# Patient Record
Sex: FEMALE | Race: WHITE | NOT HISPANIC OR LATINO | URBAN - METROPOLITAN AREA
[De-identification: names, ages, dates, MRNs, and addresses within clinical notes are randomized per-mention and may not be internally consistent; named-entity substitution may affect disease eponyms.]

---

## 2019-03-14 ENCOUNTER — OUTPATIENT (OUTPATIENT)
Dept: OUTPATIENT SERVICES | Facility: HOSPITAL | Age: 34
LOS: 1 days | Discharge: HOME | End: 2019-03-14

## 2019-03-18 LAB — SURGICAL PATHOLOGY STUDY: SIGNIFICANT CHANGE UP

## 2019-12-30 ENCOUNTER — APPOINTMENT (OUTPATIENT)
Dept: ANTEPARTUM | Facility: CLINIC | Age: 34
End: 2019-12-30
Payer: COMMERCIAL

## 2019-12-30 PROCEDURE — 76813 OB US NUCHAL MEAS 1 GEST: CPT

## 2020-01-02 PROBLEM — Z00.00 ENCOUNTER FOR PREVENTIVE HEALTH EXAMINATION: Status: ACTIVE | Noted: 2020-01-02

## 2020-02-24 ENCOUNTER — APPOINTMENT (OUTPATIENT)
Dept: ANTEPARTUM | Facility: CLINIC | Age: 35
End: 2020-02-24
Payer: COMMERCIAL

## 2020-02-24 PROCEDURE — 76811 OB US DETAILED SNGL FETUS: CPT

## 2020-02-24 PROCEDURE — 76817 TRANSVAGINAL US OBSTETRIC: CPT

## 2020-03-17 ENCOUNTER — APPOINTMENT (OUTPATIENT)
Dept: ANTEPARTUM | Facility: CLINIC | Age: 35
End: 2020-03-17
Payer: COMMERCIAL

## 2020-03-17 PROCEDURE — 76816 OB US FOLLOW-UP PER FETUS: CPT

## 2020-05-26 ENCOUNTER — APPOINTMENT (OUTPATIENT)
Dept: ANTEPARTUM | Facility: CLINIC | Age: 35
End: 2020-05-26
Payer: COMMERCIAL

## 2020-05-26 PROCEDURE — 76816 OB US FOLLOW-UP PER FETUS: CPT

## 2020-06-16 ENCOUNTER — APPOINTMENT (OUTPATIENT)
Dept: ANTEPARTUM | Facility: CLINIC | Age: 35
End: 2020-06-16
Payer: COMMERCIAL

## 2020-06-16 PROCEDURE — 76819 FETAL BIOPHYS PROFIL W/O NST: CPT

## 2020-06-16 PROCEDURE — 76816 OB US FOLLOW-UP PER FETUS: CPT

## 2020-07-07 ENCOUNTER — APPOINTMENT (OUTPATIENT)
Dept: ANTEPARTUM | Facility: CLINIC | Age: 35
End: 2020-07-07
Payer: COMMERCIAL

## 2020-07-07 PROCEDURE — 76819 FETAL BIOPHYS PROFIL W/O NST: CPT

## 2020-07-10 ENCOUNTER — TRANSCRIPTION ENCOUNTER (OUTPATIENT)
Age: 35
End: 2020-07-10

## 2020-07-11 ENCOUNTER — INPATIENT (INPATIENT)
Facility: HOSPITAL | Age: 35
LOS: 1 days | Discharge: HOME | End: 2020-07-13
Attending: OBSTETRICS & GYNECOLOGY | Admitting: OBSTETRICS & GYNECOLOGY
Payer: COMMERCIAL

## 2020-07-11 ENCOUNTER — RESULT REVIEW (OUTPATIENT)
Age: 35
End: 2020-07-11

## 2020-07-11 VITALS — HEIGHT: 64 IN | WEIGHT: 164.91 LBS

## 2020-07-11 DIAGNOSIS — Z3A.00 WEEKS OF GESTATION OF PREGNANCY NOT SPECIFIED: ICD-10-CM

## 2020-07-11 DIAGNOSIS — O26.899 OTHER SPECIFIED PREGNANCY RELATED CONDITIONS, UNSPECIFIED TRIMESTER: ICD-10-CM

## 2020-07-11 LAB
BASOPHILS # BLD AUTO: 0.03 K/UL — SIGNIFICANT CHANGE UP (ref 0–0.2)
BASOPHILS NFR BLD AUTO: 0.2 % — SIGNIFICANT CHANGE UP (ref 0–2)
BLD GP AB SCN SERPL QL: NEGATIVE — SIGNIFICANT CHANGE UP
EOSINOPHIL # BLD AUTO: 0 K/UL — SIGNIFICANT CHANGE UP (ref 0–0.5)
EOSINOPHIL NFR BLD AUTO: 0 % — SIGNIFICANT CHANGE UP (ref 0–6)
HCT VFR BLD CALC: 37.5 % — SIGNIFICANT CHANGE UP (ref 34.5–45)
HGB BLD-MCNC: 13 G/DL — SIGNIFICANT CHANGE UP (ref 11.5–15.5)
IMM GRANULOCYTES NFR BLD AUTO: 0.7 % — SIGNIFICANT CHANGE UP (ref 0–1.5)
LYMPHOCYTES # BLD AUTO: 1.12 K/UL — SIGNIFICANT CHANGE UP (ref 1–3.3)
LYMPHOCYTES # BLD AUTO: 8.3 % — LOW (ref 13–44)
MCHC RBC-ENTMCNC: 34.3 PG — HIGH (ref 27–34)
MCHC RBC-ENTMCNC: 34.7 GM/DL — SIGNIFICANT CHANGE UP (ref 32–36)
MCV RBC AUTO: 98.9 FL — SIGNIFICANT CHANGE UP (ref 80–100)
MONOCYTES # BLD AUTO: 0.79 K/UL — SIGNIFICANT CHANGE UP (ref 0–0.9)
MONOCYTES NFR BLD AUTO: 5.9 % — SIGNIFICANT CHANGE UP (ref 2–14)
NEUTROPHILS # BLD AUTO: 11.42 K/UL — HIGH (ref 1.8–7.4)
NEUTROPHILS NFR BLD AUTO: 84.9 % — HIGH (ref 43–77)
NRBC # BLD: 0 /100 WBCS — SIGNIFICANT CHANGE UP (ref 0–0)
PLATELET # BLD AUTO: 171 K/UL — SIGNIFICANT CHANGE UP (ref 150–400)
RBC # BLD: 3.79 M/UL — LOW (ref 3.8–5.2)
RBC # FLD: 12.9 % — SIGNIFICANT CHANGE UP (ref 10.3–14.5)
RH IG SCN BLD-IMP: POSITIVE — SIGNIFICANT CHANGE UP
RH IG SCN BLD-IMP: POSITIVE — SIGNIFICANT CHANGE UP
SARS-COV-2 RNA SPEC QL NAA+PROBE: SIGNIFICANT CHANGE UP
T PALLIDUM AB TITR SER: NEGATIVE — SIGNIFICANT CHANGE UP
WBC # BLD: 13.45 K/UL — HIGH (ref 3.8–10.5)
WBC # FLD AUTO: 13.45 K/UL — HIGH (ref 3.8–10.5)

## 2020-07-11 PROCEDURE — 88307 TISSUE EXAM BY PATHOLOGIST: CPT | Mod: 26

## 2020-07-11 RX ORDER — DEXAMETHASONE 0.5 MG/5ML
4 ELIXIR ORAL EVERY 6 HOURS
Refills: 0 | Status: DISCONTINUED | OUTPATIENT
Start: 2020-07-11 | End: 2020-07-11

## 2020-07-11 RX ORDER — AMPICILLIN TRIHYDRATE 250 MG
2 CAPSULE ORAL ONCE
Refills: 0 | Status: COMPLETED | OUTPATIENT
Start: 2020-07-11 | End: 2020-07-11

## 2020-07-11 RX ORDER — CITRIC ACID/SODIUM CITRATE 300-500 MG
15 SOLUTION, ORAL ORAL EVERY 6 HOURS
Refills: 0 | Status: DISCONTINUED | OUTPATIENT
Start: 2020-07-11 | End: 2020-07-11

## 2020-07-11 RX ORDER — FENTANYL/BUPIVACAINE/NS/PF 2MCG/ML-.1
250 PLASTIC BAG, INJECTION (ML) INJECTION
Refills: 0 | Status: DISCONTINUED | OUTPATIENT
Start: 2020-07-11 | End: 2020-07-11

## 2020-07-11 RX ORDER — IBUPROFEN 200 MG
600 TABLET ORAL EVERY 6 HOURS
Refills: 0 | Status: COMPLETED | OUTPATIENT
Start: 2020-07-11 | End: 2021-06-09

## 2020-07-11 RX ORDER — OXYTOCIN 10 UNIT/ML
333.33 VIAL (ML) INJECTION
Qty: 20 | Refills: 0 | Status: DISCONTINUED | OUTPATIENT
Start: 2020-07-11 | End: 2020-07-11

## 2020-07-11 RX ORDER — OXYCODONE HYDROCHLORIDE 5 MG/1
5 TABLET ORAL
Refills: 0 | Status: DISCONTINUED | OUTPATIENT
Start: 2020-07-11 | End: 2020-07-13

## 2020-07-11 RX ORDER — DIPHENHYDRAMINE HCL 50 MG
25 CAPSULE ORAL EVERY 6 HOURS
Refills: 0 | Status: DISCONTINUED | OUTPATIENT
Start: 2020-07-11 | End: 2020-07-13

## 2020-07-11 RX ORDER — KETOROLAC TROMETHAMINE 30 MG/ML
30 SYRINGE (ML) INJECTION EVERY 6 HOURS
Refills: 0 | Status: DISCONTINUED | OUTPATIENT
Start: 2020-07-11 | End: 2020-07-12

## 2020-07-11 RX ORDER — LANOLIN
1 OINTMENT (GRAM) TOPICAL EVERY 6 HOURS
Refills: 0 | Status: DISCONTINUED | OUTPATIENT
Start: 2020-07-11 | End: 2020-07-13

## 2020-07-11 RX ORDER — OXYCODONE HYDROCHLORIDE 5 MG/1
5 TABLET ORAL ONCE
Refills: 0 | Status: DISCONTINUED | OUTPATIENT
Start: 2020-07-11 | End: 2020-07-13

## 2020-07-11 RX ORDER — SODIUM CHLORIDE 9 MG/ML
1000 INJECTION, SOLUTION INTRAVENOUS
Refills: 0 | Status: DISCONTINUED | OUTPATIENT
Start: 2020-07-11 | End: 2020-07-13

## 2020-07-11 RX ORDER — OXYTOCIN 10 UNIT/ML
2 VIAL (ML) INJECTION
Qty: 30 | Refills: 0 | Status: DISCONTINUED | OUTPATIENT
Start: 2020-07-11 | End: 2020-07-11

## 2020-07-11 RX ORDER — ENOXAPARIN SODIUM 100 MG/ML
40 INJECTION SUBCUTANEOUS EVERY 24 HOURS
Refills: 0 | Status: DISCONTINUED | OUTPATIENT
Start: 2020-07-12 | End: 2020-07-13

## 2020-07-11 RX ORDER — SODIUM CHLORIDE 9 MG/ML
1000 INJECTION, SOLUTION INTRAVENOUS
Refills: 0 | Status: DISCONTINUED | OUTPATIENT
Start: 2020-07-11 | End: 2020-07-11

## 2020-07-11 RX ORDER — TETANUS TOXOID, REDUCED DIPHTHERIA TOXOID AND ACELLULAR PERTUSSIS VACCINE, ADSORBED 5; 2.5; 8; 8; 2.5 [IU]/.5ML; [IU]/.5ML; UG/.5ML; UG/.5ML; UG/.5ML
0.5 SUSPENSION INTRAMUSCULAR ONCE
Refills: 0 | Status: DISCONTINUED | OUTPATIENT
Start: 2020-07-11 | End: 2020-07-13

## 2020-07-11 RX ORDER — ONDANSETRON 8 MG/1
4 TABLET, FILM COATED ORAL EVERY 6 HOURS
Refills: 0 | Status: DISCONTINUED | OUTPATIENT
Start: 2020-07-11 | End: 2020-07-11

## 2020-07-11 RX ORDER — MORPHINE SULFATE 50 MG/1
4 CAPSULE, EXTENDED RELEASE ORAL ONCE
Refills: 0 | Status: DISCONTINUED | OUTPATIENT
Start: 2020-07-11 | End: 2020-07-11

## 2020-07-11 RX ORDER — AMPICILLIN TRIHYDRATE 250 MG
1 CAPSULE ORAL EVERY 4 HOURS
Refills: 0 | Status: DISCONTINUED | OUTPATIENT
Start: 2020-07-11 | End: 2020-07-11

## 2020-07-11 RX ORDER — SIMETHICONE 80 MG/1
80 TABLET, CHEWABLE ORAL EVERY 4 HOURS
Refills: 0 | Status: DISCONTINUED | OUTPATIENT
Start: 2020-07-11 | End: 2020-07-13

## 2020-07-11 RX ORDER — NALOXONE HYDROCHLORIDE 4 MG/.1ML
0.1 SPRAY NASAL
Refills: 0 | Status: DISCONTINUED | OUTPATIENT
Start: 2020-07-11 | End: 2020-07-11

## 2020-07-11 RX ORDER — ACETAMINOPHEN 500 MG
975 TABLET ORAL
Refills: 0 | Status: DISCONTINUED | OUTPATIENT
Start: 2020-07-11 | End: 2020-07-13

## 2020-07-11 RX ORDER — MAGNESIUM HYDROXIDE 400 MG/1
30 TABLET, CHEWABLE ORAL
Refills: 0 | Status: DISCONTINUED | OUTPATIENT
Start: 2020-07-11 | End: 2020-07-13

## 2020-07-11 RX ORDER — OXYTOCIN 10 UNIT/ML
333.33 VIAL (ML) INJECTION
Qty: 20 | Refills: 0 | Status: DISCONTINUED | OUTPATIENT
Start: 2020-07-11 | End: 2020-07-13

## 2020-07-11 RX ADMIN — Medication 108 GRAM(S): at 07:03

## 2020-07-11 RX ADMIN — SODIUM CHLORIDE 125 MILLILITER(S): 9 INJECTION, SOLUTION INTRAVENOUS at 20:38

## 2020-07-11 RX ADMIN — SODIUM CHLORIDE 125 MILLILITER(S): 9 INJECTION, SOLUTION INTRAVENOUS at 03:00

## 2020-07-11 RX ADMIN — Medication 2 MILLIUNIT(S)/MIN: at 08:22

## 2020-07-11 RX ADMIN — Medication 216 GRAM(S): at 02:59

## 2020-07-11 RX ADMIN — Medication 1000 MILLIUNIT(S)/MIN: at 15:20

## 2020-07-11 RX ADMIN — Medication 975 MILLIGRAM(S): at 20:36

## 2020-07-11 RX ADMIN — Medication 108 GRAM(S): at 10:48

## 2020-07-11 RX ADMIN — Medication 30 MILLIGRAM(S): at 15:38

## 2020-07-11 RX ADMIN — Medication 15 MILLILITER(S): at 13:44

## 2020-07-12 LAB
BASOPHILS # BLD AUTO: 0 K/UL — SIGNIFICANT CHANGE UP (ref 0–0.2)
BASOPHILS NFR BLD AUTO: 0 % — SIGNIFICANT CHANGE UP (ref 0–2)
EOSINOPHIL # BLD AUTO: 0 K/UL — SIGNIFICANT CHANGE UP (ref 0–0.5)
EOSINOPHIL NFR BLD AUTO: 0 % — SIGNIFICANT CHANGE UP (ref 0–6)
HCT VFR BLD CALC: 29.3 % — LOW (ref 34.5–45)
HGB BLD-MCNC: 10.1 G/DL — LOW (ref 11.5–15.5)
LYMPHOCYTES # BLD AUTO: 0.77 K/UL — LOW (ref 1–3.3)
LYMPHOCYTES # BLD AUTO: 7 % — LOW (ref 13–44)
MCHC RBC-ENTMCNC: 34.5 GM/DL — SIGNIFICANT CHANGE UP (ref 32–36)
MCHC RBC-ENTMCNC: 34.9 PG — HIGH (ref 27–34)
MCV RBC AUTO: 101.4 FL — HIGH (ref 80–100)
MONOCYTES # BLD AUTO: 0.2 K/UL — SIGNIFICANT CHANGE UP (ref 0–0.9)
MONOCYTES NFR BLD AUTO: 1.8 % — LOW (ref 2–14)
NEUTROPHILS # BLD AUTO: 10.08 K/UL — HIGH (ref 1.8–7.4)
NEUTROPHILS NFR BLD AUTO: 91.2 % — HIGH (ref 43–77)
PLATELET # BLD AUTO: 119 K/UL — LOW (ref 150–400)
RBC # BLD: 2.89 M/UL — LOW (ref 3.8–5.2)
RBC # FLD: 13.2 % — SIGNIFICANT CHANGE UP (ref 10.3–14.5)
WBC # BLD: 11.05 K/UL — HIGH (ref 3.8–10.5)
WBC # FLD AUTO: 11.05 K/UL — HIGH (ref 3.8–10.5)

## 2020-07-12 RX ORDER — IBUPROFEN 200 MG
600 TABLET ORAL EVERY 6 HOURS
Refills: 0 | Status: DISCONTINUED | OUTPATIENT
Start: 2020-07-12 | End: 2020-07-13

## 2020-07-12 RX ADMIN — ENOXAPARIN SODIUM 40 MILLIGRAM(S): 100 INJECTION SUBCUTANEOUS at 05:37

## 2020-07-12 RX ADMIN — Medication 30 MILLIGRAM(S): at 12:06

## 2020-07-12 RX ADMIN — Medication 975 MILLIGRAM(S): at 09:17

## 2020-07-12 RX ADMIN — Medication 30 MILLIGRAM(S): at 00:27

## 2020-07-12 RX ADMIN — Medication 975 MILLIGRAM(S): at 03:53

## 2020-07-12 RX ADMIN — Medication 600 MILLIGRAM(S): at 18:07

## 2020-07-12 RX ADMIN — Medication 975 MILLIGRAM(S): at 15:28

## 2020-07-12 RX ADMIN — Medication 975 MILLIGRAM(S): at 21:35

## 2020-07-12 RX ADMIN — Medication 30 MILLIGRAM(S): at 05:37

## 2020-07-12 NOTE — LACTATION INITIAL EVALUATION - NS LACT CON REASON FOR REQ
23hrs post primary c/s for arrest of labor at 39.6 wks. no wt loss recorded. 1 void/4stools. mother reports successful breastfeeding. observed deep latch in a laid back cradle hold on both breasts with round nipple, no compression. reviewed bfing basics, positioning techniques, feeding/satiety cues, signs of good latch, and encouraged s2s contact. taught hand expression with return demo. easily expressible colostrum. advised responsive feeding 8-12x/day./primaparous mom

## 2020-07-13 ENCOUNTER — TRANSCRIPTION ENCOUNTER (OUTPATIENT)
Age: 35
End: 2020-07-13

## 2020-07-13 VITALS
OXYGEN SATURATION: 98 % | DIASTOLIC BLOOD PRESSURE: 75 MMHG | SYSTOLIC BLOOD PRESSURE: 112 MMHG | HEART RATE: 62 BPM | TEMPERATURE: 98 F | RESPIRATION RATE: 18 BRPM

## 2020-07-13 PROCEDURE — 86780 TREPONEMA PALLIDUM: CPT

## 2020-07-13 PROCEDURE — C1765: CPT

## 2020-07-13 PROCEDURE — 86901 BLOOD TYPING SEROLOGIC RH(D): CPT

## 2020-07-13 PROCEDURE — 88307 TISSUE EXAM BY PATHOLOGIST: CPT

## 2020-07-13 PROCEDURE — 99214 OFFICE O/P EST MOD 30 MIN: CPT

## 2020-07-13 PROCEDURE — 86850 RBC ANTIBODY SCREEN: CPT

## 2020-07-13 PROCEDURE — 85025 COMPLETE CBC W/AUTO DIFF WBC: CPT

## 2020-07-13 PROCEDURE — 87635 SARS-COV-2 COVID-19 AMP PRB: CPT

## 2020-07-13 PROCEDURE — 36415 COLL VENOUS BLD VENIPUNCTURE: CPT

## 2020-07-13 RX ADMIN — Medication 975 MILLIGRAM(S): at 09:36

## 2020-07-13 RX ADMIN — Medication 600 MILLIGRAM(S): at 00:11

## 2020-07-13 RX ADMIN — Medication 600 MILLIGRAM(S): at 13:06

## 2020-07-13 RX ADMIN — Medication 975 MILLIGRAM(S): at 20:27

## 2020-07-13 RX ADMIN — ENOXAPARIN SODIUM 40 MILLIGRAM(S): 100 INJECTION SUBCUTANEOUS at 06:00

## 2020-07-13 RX ADMIN — Medication 600 MILLIGRAM(S): at 19:00

## 2020-07-13 RX ADMIN — Medication 600 MILLIGRAM(S): at 06:00

## 2020-07-13 RX ADMIN — Medication 975 MILLIGRAM(S): at 15:27

## 2020-07-13 NOTE — DISCHARGE NOTE OB - PATIENT PORTAL LINK FT
You can access the FollowMyHealth Patient Portal offered by [facility] by registering at the following website: [email or web address]. By joining [facility]’s FollowMyHealth portal, you will also be able to view your health information using other applications (apps) compatible with our system.

## 2020-07-13 NOTE — DISCHARGE NOTE OB - MATERIALS PROVIDED
Breastfeeding Log/Breastfeeding Guide and Packet/Tdap Vaccination (VIS Pub Date: 2012)/[place]  Immunization Record/Guide to Postpartum Care/Vaccinations/Shaken Baby Prevention Handout/Discharge Medication Information for Patients and Families Pocket Guide/Back To Sleep Handout/Breastfeeding Mother’s Support Group Information

## 2020-07-13 NOTE — DISCHARGE NOTE OB - PLAN OF CARE
feel well Vaginal delivery, meeting all postpartum milestones.  Follow up in 6 weeks.  delivery, meeting all postoperative milestones.  Follow up in 1-2 weeks.

## 2020-07-13 NOTE — DISCHARGE NOTE OB - CARE PLAN
Principal Discharge DX:	Vaginal delivery  Goal:	feel well  Assessment and plan of treatment:	Vaginal delivery, meeting all postpartum milestones.  Follow up in 6 weeks. Principal Discharge DX:	 delivery delivered  Goal:	feel well  Assessment and plan of treatment:	 delivery, meeting all postoperative milestones.  Follow up in 1-2 weeks.

## 2020-07-13 NOTE — PROGRESS NOTE ADULT - ASSESSMENT
A/P   35y  s/p  section, POD #1, stable  -  Pain: PO motrin q6h, Tylenol q6h, oxycodone for severe pain PRN  -  Post-operatively labs: post-op Hgb is pending  -  GI: tolerating clears, passing gas, ADAT  -  : s/p leiva, f/u TOV  -  DVT prophylaxis: ambulation, SCDs, Lovenox  -  Dispo: POD 2 or 3
A/P  35y s/p c/s, POD # 2 ,stable & mtg postop [place]    Diet: tolerating regular diet  Pain: OPM  IV fluid: PO hydration  OOB/SCDs/IS  Continue to monitor  Anticipate discharge tonight vs. tomorrow
A/P: 35y s/p  section, POD#2, stable  -  Pain: PO motrin q6hrs, tylenol q8hrs, oxycodone for severe pain PRN  -  Post-operatively labs: post-op Hgb 10.1, hemodynamically stable, no symptoms of anemia   -  GI: tolerating regular diet, passing gas  -  : s/p leiva , urinating without difficulty  -  DVT prophylaxis: encouraged increased ambulation, SCDs, Lovenox  -  Dispo: POD 3 or 4

## 2020-07-13 NOTE — DISCHARGE NOTE OB - CARE PROVIDER_API CALL
Larissa Bernstein  OBSTETRICS AND GYNECOLOGY  [address] [place] [address], [place] [address]  Phone: [phone]  Fax: [phone]  Follow Up Time:

## 2020-07-13 NOTE — PROGRESS NOTE ADULT - SUBJECTIVE AND OBJECTIVE BOX
Patient evaluated at bedside this morning, resting comfortable in bed.   She reports pain is well controlled.  She denies headache, dizziness, chest pain, palpitations, shortness of breathe, nausea, vomiting or heavy vaginal bleeding.  She has been ambulating without assistance, voiding spontaneously, passing gas, tolerating regular diet and is breastfeeding.    Physical Exam:  Vital Signs Last 24 Hrs  T(C): 36.7 (13 Jul 2020 06:00), Max: 37.5 (12 Jul 2020 10:07)  T(F): 98 (13 Jul 2020 06:00), Max: 99.5 (12 Jul 2020 10:07)  HR: 62 (13 Jul 2020 06:00) (62 - 88)  BP: 106/70 (13 Jul 2020 06:00) (100/64 - 121/76)  BP(mean): --  RR: 18 (13 Jul 2020 06:00) (17 - 19)  SpO2: 98% (13 Jul 2020 06:00) (96% - 100%)    GA: NAD, A+0 x 3  Abd: + BS, soft, nontender, nondistended, no rebound or guarding, incision clean, dry and intact, uterus firm at midline and below umbilicus  : lochia WNL  Extremities: no swelling or calf tenderness                             10.1   11.05 )-----------( 119      ( 12 Jul 2020 08:25 )             29.3
Patient evaluated at bedside. No acute events overnight. She reports pain is well controlled with OPM. Adequate urine output.     Physical Exam:  Vital Signs Last 24 Hrs  T(C): 36.7 (12 Jul 2020 13:55), Max: 37.5 (12 Jul 2020 10:07)  T(F): 98.1 (12 Jul 2020 13:55), Max: 99.5 (12 Jul 2020 10:07)  HR: 65 (12 Jul 2020 13:55) (62 - 78)  BP: 106/70 (12 Jul 2020 13:55) (94/56 - 121/75)  BP(mean): --  RR: 18 (12 Jul 2020 13:55) (12 - 20)  SpO2: 96% (12 Jul 2020 13:55) (96% - 99%)    GA: NAD, A+0 x 3  Abd: + BS, soft, nontender, nondistended, no rebound or guarding, uterus firm at midline, 2 fb below umbilicus  Incision clean, dry and intact  : lochia WNL  Extremities: no swelling or calf tenderness                            10.1   11.05 )-----------( 119      ( 12 Jul 2020 08:25 )             29.3       A/P  yo 35y s/p c/s, POD #1  ,stable    Diet: regular  Pain: OPM  OOB/SCDs/IS  Continue to monitor  Anticipate discharge POD #3 or #4
Patient evaluated at bedside. No acute events overnight. She reports pain is well controlled with OPM. She is ambulating, voiding, tolerating PO, & passing flatus. Denies n/v/f/c, cp, or dyspnea.       Physical Exam:  Vital Signs Last 24 Hrs  T(C): 36.7 (13 Jul 2020 06:00), Max: 36.8 (12 Jul 2020 18:18)  T(F): 98 (13 Jul 2020 06:00), Max: 98.2 (12 Jul 2020 18:18)  HR: 62 (13 Jul 2020 06:00) (62 - 88)  BP: 106/70 (13 Jul 2020 06:00) (100/64 - 121/76)  BP(mean): --  RR: 18 (13 Jul 2020 06:00) (17 - 19)  SpO2: 98% (13 Jul 2020 06:00) (96% - 100%)    GA: NAD, A+0 x 3  Abd: + BS, soft, nontender, nondistended, no rebound or guarding, uterus firm at midline, 2 fb below umbilicus  Incision clean, dry and intact  : lochia WNL  Extremities: no swelling or calf tenderness                            10.1   11.05 )-----------( 119      ( 12 Jul 2020 08:25 )             29.3
Patient evaluated at bedside this morning, resting comfortable in bed, with no acute events overnight.  She reports pain is well controlled.  She denies headache, dizziness, chest pain, palpitations, shortness of breathe, nausea, vomiting or heavy vaginal bleeding.  She has not tried ambulating since procedure, leiva was removed at this time. Tolerating clear liquids.     Physical Exam:  Vital Signs Last 24 Hrs  T(C): 36.8 (12 Jul 2020 05:18), Max: 37.2 (11 Jul 2020 22:00)  T(F): 98.3 (12 Jul 2020 05:18), Max: 98.9 (11 Jul 2020 22:00)  HR: 66 (12 Jul 2020 05:18) (62 - 78)  BP: 96/60 (12 Jul 2020 05:18) (94/56 - 121/75)  BP(mean): --  RR: 18 (12 Jul 2020 05:18) (12 - 20)  SpO2: 96% (12 Jul 2020 05:18) (96% - 99%)    GA: NAD, A+0 x 3  Abd: + BS, soft, nontender, nondistended, no rebound or guarding, incision clean, dry and intact, uterus firm at midline,  below umbilicus  :  lochia WNL  Extremities: no swelling or calf tenderness, reflexes +2 bilaterally.                            13.0   13.45 )-----------( 171      ( 11 Jul 2020 03:07 )             37.5         acetaminophen   Tablet .. 975 milliGRAM(s) Oral <User Schedule>  diphenhydrAMINE 25 milliGRAM(s) Oral every 6 hours PRN  diphtheria/tetanus/pertussis (acellular) Vaccine (ADAcel) 0.5 milliLiter(s) IntraMuscular once  enoxaparin Injectable 40 milliGRAM(s) SubCutaneous every 24 hours  ibuprofen  Tablet. 600 milliGRAM(s) Oral every 6 hours  ketorolac   Injectable 30 milliGRAM(s) IV Push every 6 hours  lactated ringers. 1000 milliLiter(s) IV Continuous <Continuous>  lanolin Ointment 1 Application(s) Topical every 6 hours PRN  magnesium hydroxide Suspension 30 milliLiter(s) Oral two times a day PRN  oxyCODONE    IR 5 milliGRAM(s) Oral every 3 hours PRN  oxyCODONE    IR 5 milliGRAM(s) Oral once PRN  oxytocin Infusion 333.333 milliUNIT(s)/Min IV Continuous <Continuous>  simethicone 80 milliGRAM(s) Chew every 4 hours PRN

## 2020-07-14 ENCOUNTER — APPOINTMENT (OUTPATIENT)
Dept: ANTEPARTUM | Facility: CLINIC | Age: 35
End: 2020-07-14

## 2020-07-15 LAB — SURGICAL PATHOLOGY STUDY: SIGNIFICANT CHANGE UP

## 2020-07-16 DIAGNOSIS — Z34.03 ENCOUNTER FOR SUPERVISION OF NORMAL FIRST PREGNANCY, THIRD TRIMESTER: ICD-10-CM

## 2020-07-16 DIAGNOSIS — Z3A.39 39 WEEKS GESTATION OF PREGNANCY: ICD-10-CM

## 2022-07-19 ENCOUNTER — INPATIENT (INPATIENT)
Facility: HOSPITAL | Age: 37
LOS: 0 days | Discharge: HOME | End: 2022-07-20
Attending: OBSTETRICS & GYNECOLOGY | Admitting: OBSTETRICS & GYNECOLOGY
Payer: COMMERCIAL

## 2022-07-19 VITALS — WEIGHT: 149.91 LBS | HEIGHT: 64 IN

## 2022-07-19 DIAGNOSIS — O34.219 MATERNAL CARE FOR UNSPECIFIED TYPE SCAR FROM PREVIOUS CESAREAN DELIVERY: ICD-10-CM

## 2022-07-19 DIAGNOSIS — Z3A.39 39 WEEKS GESTATION OF PREGNANCY: ICD-10-CM

## 2022-07-19 DIAGNOSIS — Z34.83 ENCOUNTER FOR SUPERVISION OF OTHER NORMAL PREGNANCY, THIRD TRIMESTER: ICD-10-CM

## 2022-07-19 DIAGNOSIS — U07.1 COVID-19: ICD-10-CM

## 2022-07-19 LAB
BASOPHILS # BLD AUTO: 0.11 K/UL — SIGNIFICANT CHANGE UP (ref 0–0.2)
BASOPHILS NFR BLD AUTO: 0.9 % — SIGNIFICANT CHANGE UP (ref 0–2)
DACRYOCYTES BLD QL SMEAR: SLIGHT — SIGNIFICANT CHANGE UP
EOSINOPHIL # BLD AUTO: 0.11 K/UL — SIGNIFICANT CHANGE UP (ref 0–0.5)
EOSINOPHIL NFR BLD AUTO: 0.9 % — SIGNIFICANT CHANGE UP (ref 0–6)
HCT VFR BLD CALC: 39.3 % — SIGNIFICANT CHANGE UP (ref 34.5–45)
HGB BLD-MCNC: 13.6 G/DL — SIGNIFICANT CHANGE UP (ref 11.5–15.5)
LYMPHOCYTES # BLD AUTO: 1.3 K/UL — SIGNIFICANT CHANGE UP (ref 1–3.3)
LYMPHOCYTES # BLD AUTO: 10.7 % — LOW (ref 13–44)
MANUAL SMEAR VERIFICATION: SIGNIFICANT CHANGE UP
MCHC RBC-ENTMCNC: 34.6 GM/DL — SIGNIFICANT CHANGE UP (ref 32–36)
MCHC RBC-ENTMCNC: 34.6 PG — HIGH (ref 27–34)
MCV RBC AUTO: 100 FL — SIGNIFICANT CHANGE UP (ref 80–100)
MONOCYTES # BLD AUTO: 1.19 K/UL — HIGH (ref 0–0.9)
MONOCYTES NFR BLD AUTO: 9.8 % — SIGNIFICANT CHANGE UP (ref 2–14)
NEUTROPHILS # BLD AUTO: 9.41 K/UL — HIGH (ref 1.8–7.4)
NEUTROPHILS NFR BLD AUTO: 77.7 % — HIGH (ref 43–77)
PLAT MORPH BLD: ABNORMAL
PLATELET # BLD AUTO: 165 K/UL — SIGNIFICANT CHANGE UP (ref 150–400)
POIKILOCYTOSIS BLD QL AUTO: SLIGHT — SIGNIFICANT CHANGE UP
POLYCHROMASIA BLD QL SMEAR: SLIGHT — SIGNIFICANT CHANGE UP
RBC # BLD: 3.93 M/UL — SIGNIFICANT CHANGE UP (ref 3.8–5.2)
RBC # FLD: 12.4 % — SIGNIFICANT CHANGE UP (ref 10.3–14.5)
RBC BLD AUTO: ABNORMAL
SARS-COV-2 RNA SPEC QL NAA+PROBE: DETECTED
WBC # BLD: 12.11 K/UL — HIGH (ref 3.8–10.5)
WBC # FLD AUTO: 12.11 K/UL — HIGH (ref 3.8–10.5)

## 2022-07-19 RX ORDER — KETOROLAC TROMETHAMINE 30 MG/ML
30 SYRINGE (ML) INJECTION ONCE
Refills: 0 | Status: DISCONTINUED | OUTPATIENT
Start: 2022-07-19 | End: 2022-07-19

## 2022-07-19 RX ORDER — DIBUCAINE 1 %
1 OINTMENT (GRAM) RECTAL EVERY 6 HOURS
Refills: 0 | Status: DISCONTINUED | OUTPATIENT
Start: 2022-07-19 | End: 2022-07-20

## 2022-07-19 RX ORDER — SODIUM CHLORIDE 9 MG/ML
1000 INJECTION, SOLUTION INTRAVENOUS
Refills: 0 | Status: DISCONTINUED | OUTPATIENT
Start: 2022-07-19 | End: 2022-07-19

## 2022-07-19 RX ORDER — CHLORHEXIDINE GLUCONATE 213 G/1000ML
1 SOLUTION TOPICAL ONCE
Refills: 0 | Status: DISCONTINUED | OUTPATIENT
Start: 2022-07-19 | End: 2022-07-19

## 2022-07-19 RX ORDER — SODIUM CHLORIDE 9 MG/ML
3 INJECTION INTRAMUSCULAR; INTRAVENOUS; SUBCUTANEOUS EVERY 8 HOURS
Refills: 0 | Status: DISCONTINUED | OUTPATIENT
Start: 2022-07-19 | End: 2022-07-20

## 2022-07-19 RX ORDER — PRAMOXINE HYDROCHLORIDE 150 MG/15G
1 AEROSOL, FOAM RECTAL EVERY 4 HOURS
Refills: 0 | Status: DISCONTINUED | OUTPATIENT
Start: 2022-07-19 | End: 2022-07-20

## 2022-07-19 RX ORDER — IBUPROFEN 200 MG
600 TABLET ORAL EVERY 6 HOURS
Refills: 0 | Status: COMPLETED | OUTPATIENT
Start: 2022-07-19 | End: 2023-06-17

## 2022-07-19 RX ORDER — OXYTOCIN 10 UNIT/ML
333.33 VIAL (ML) INJECTION
Qty: 20 | Refills: 0 | Status: DISCONTINUED | OUTPATIENT
Start: 2022-07-19 | End: 2022-07-19

## 2022-07-19 RX ORDER — BENZOCAINE 10 %
1 GEL (GRAM) MUCOUS MEMBRANE EVERY 6 HOURS
Refills: 0 | Status: DISCONTINUED | OUTPATIENT
Start: 2022-07-19 | End: 2022-07-20

## 2022-07-19 RX ORDER — ACETAMINOPHEN 500 MG
975 TABLET ORAL
Refills: 0 | Status: DISCONTINUED | OUTPATIENT
Start: 2022-07-19 | End: 2022-07-20

## 2022-07-19 RX ORDER — AER TRAVELER 0.5 G/1
1 SOLUTION RECTAL; TOPICAL EVERY 4 HOURS
Refills: 0 | Status: DISCONTINUED | OUTPATIENT
Start: 2022-07-19 | End: 2022-07-20

## 2022-07-19 RX ORDER — CITRIC ACID/SODIUM CITRATE 300-500 MG
15 SOLUTION, ORAL ORAL EVERY 6 HOURS
Refills: 0 | Status: DISCONTINUED | OUTPATIENT
Start: 2022-07-19 | End: 2022-07-19

## 2022-07-19 RX ORDER — OXYTOCIN 10 UNIT/ML
333.33 VIAL (ML) INJECTION
Qty: 20 | Refills: 0 | Status: DISCONTINUED | OUTPATIENT
Start: 2022-07-19 | End: 2022-07-20

## 2022-07-19 RX ORDER — MAGNESIUM HYDROXIDE 400 MG/1
30 TABLET, CHEWABLE ORAL
Refills: 0 | Status: DISCONTINUED | OUTPATIENT
Start: 2022-07-19 | End: 2022-07-20

## 2022-07-19 RX ORDER — OXYCODONE HYDROCHLORIDE 5 MG/1
5 TABLET ORAL
Refills: 0 | Status: DISCONTINUED | OUTPATIENT
Start: 2022-07-19 | End: 2022-07-20

## 2022-07-19 RX ORDER — TETANUS TOXOID, REDUCED DIPHTHERIA TOXOID AND ACELLULAR PERTUSSIS VACCINE, ADSORBED 5; 2.5; 8; 8; 2.5 [IU]/.5ML; [IU]/.5ML; UG/.5ML; UG/.5ML; UG/.5ML
0.5 SUSPENSION INTRAMUSCULAR ONCE
Refills: 0 | Status: DISCONTINUED | OUTPATIENT
Start: 2022-07-19 | End: 2022-07-20

## 2022-07-19 RX ORDER — HYDROCORTISONE 1 %
1 OINTMENT (GRAM) TOPICAL EVERY 6 HOURS
Refills: 0 | Status: DISCONTINUED | OUTPATIENT
Start: 2022-07-19 | End: 2022-07-20

## 2022-07-19 RX ORDER — DIPHENHYDRAMINE HCL 50 MG
25 CAPSULE ORAL EVERY 6 HOURS
Refills: 0 | Status: DISCONTINUED | OUTPATIENT
Start: 2022-07-19 | End: 2022-07-20

## 2022-07-19 RX ORDER — LANOLIN
1 OINTMENT (GRAM) TOPICAL EVERY 6 HOURS
Refills: 0 | Status: DISCONTINUED | OUTPATIENT
Start: 2022-07-19 | End: 2022-07-20

## 2022-07-19 RX ORDER — SIMETHICONE 80 MG/1
80 TABLET, CHEWABLE ORAL EVERY 4 HOURS
Refills: 0 | Status: DISCONTINUED | OUTPATIENT
Start: 2022-07-19 | End: 2022-07-20

## 2022-07-19 RX ORDER — OXYCODONE HYDROCHLORIDE 5 MG/1
5 TABLET ORAL ONCE
Refills: 0 | Status: DISCONTINUED | OUTPATIENT
Start: 2022-07-19 | End: 2022-07-20

## 2022-07-19 RX ADMIN — SODIUM CHLORIDE 3 MILLILITER(S): 9 INJECTION INTRAMUSCULAR; INTRAVENOUS; SUBCUTANEOUS at 22:22

## 2022-07-19 RX ADMIN — Medication 30 MILLIGRAM(S): at 20:55

## 2022-07-19 NOTE — PATIENT PROFILE OB - FALL HARM RISK - UNIVERSAL INTERVENTIONS
Bed in lowest position, wheels locked, appropriate side rails in place/Call bell, personal items and telephone in reach/Instruct patient to call for assistance before getting out of bed or chair/Non-slip footwear when patient is out of bed/[place] to call system/Purposeful Proactive Rounding/Room/bathroom lighting operational, light cord in reach

## 2022-07-20 ENCOUNTER — TRANSCRIPTION ENCOUNTER (OUTPATIENT)
Age: 37
End: 2022-07-20

## 2022-07-20 VITALS
TEMPERATURE: 99 F | OXYGEN SATURATION: 98 % | HEART RATE: 91 BPM | SYSTOLIC BLOOD PRESSURE: 99 MMHG | RESPIRATION RATE: 18 BRPM | DIASTOLIC BLOOD PRESSURE: 63 MMHG

## 2022-07-20 LAB
COVID-19 SPIKE DOMAIN AB INTERP: POSITIVE
COVID-19 SPIKE DOMAIN ANTIBODY RESULT: >250 U/ML — HIGH
SARS-COV-2 IGG+IGM SERPL QL IA: >250 U/ML — HIGH
SARS-COV-2 IGG+IGM SERPL QL IA: POSITIVE
T PALLIDUM AB TITR SER: NEGATIVE — SIGNIFICANT CHANGE UP

## 2022-07-20 PROCEDURE — 85025 COMPLETE CBC W/AUTO DIFF WBC: CPT

## 2022-07-20 PROCEDURE — 36415 COLL VENOUS BLD VENIPUNCTURE: CPT

## 2022-07-20 PROCEDURE — 87635 SARS-COV-2 COVID-19 AMP PRB: CPT

## 2022-07-20 PROCEDURE — 86900 BLOOD TYPING SEROLOGIC ABO: CPT

## 2022-07-20 PROCEDURE — 86769 SARS-COV-2 COVID-19 ANTIBODY: CPT

## 2022-07-20 PROCEDURE — 86901 BLOOD TYPING SEROLOGIC RH(D): CPT

## 2022-07-20 PROCEDURE — 86780 TREPONEMA PALLIDUM: CPT

## 2022-07-20 PROCEDURE — 86850 RBC ANTIBODY SCREEN: CPT

## 2022-07-20 RX ORDER — ACETAMINOPHEN 500 MG
3 TABLET ORAL
Qty: 0 | Refills: 0 | DISCHARGE
Start: 2022-07-20

## 2022-07-20 RX ORDER — IBUPROFEN 200 MG
600 TABLET ORAL EVERY 6 HOURS
Refills: 0 | Status: DISCONTINUED | OUTPATIENT
Start: 2022-07-20 | End: 2022-07-20

## 2022-07-20 RX ORDER — IBUPROFEN 200 MG
1 TABLET ORAL
Qty: 0 | Refills: 0 | DISCHARGE
Start: 2022-07-20

## 2022-07-20 RX ADMIN — Medication 600 MILLIGRAM(S): at 03:36

## 2022-07-20 RX ADMIN — Medication 975 MILLIGRAM(S): at 06:28

## 2022-07-20 RX ADMIN — Medication 975 MILLIGRAM(S): at 00:44

## 2022-07-20 RX ADMIN — Medication 600 MILLIGRAM(S): at 17:45

## 2022-07-20 RX ADMIN — SODIUM CHLORIDE 3 MILLILITER(S): 9 INJECTION INTRAMUSCULAR; INTRAVENOUS; SUBCUTANEOUS at 12:46

## 2022-07-20 RX ADMIN — Medication 600 MILLIGRAM(S): at 12:12

## 2022-07-20 RX ADMIN — Medication 975 MILLIGRAM(S): at 06:55

## 2022-07-20 RX ADMIN — Medication 600 MILLIGRAM(S): at 17:59

## 2022-07-20 RX ADMIN — Medication 975 MILLIGRAM(S): at 12:46

## 2022-07-20 RX ADMIN — Medication 1 TABLET(S): at 12:12

## 2022-07-20 RX ADMIN — SODIUM CHLORIDE 3 MILLILITER(S): 9 INJECTION INTRAMUSCULAR; INTRAVENOUS; SUBCUTANEOUS at 07:22

## 2022-07-20 RX ADMIN — Medication 975 MILLIGRAM(S): at 12:11

## 2022-07-20 RX ADMIN — Medication 600 MILLIGRAM(S): at 04:00

## 2022-07-20 RX ADMIN — Medication 975 MILLIGRAM(S): at 17:59

## 2022-07-20 RX ADMIN — Medication 600 MILLIGRAM(S): at 12:46

## 2022-07-20 RX ADMIN — Medication 975 MILLIGRAM(S): at 17:44

## 2022-07-20 RX ADMIN — Medication 975 MILLIGRAM(S): at 00:57

## 2022-07-20 NOTE — DISCHARGE NOTE OB - HOSPITAL COURSE
Patient is status post a vaginal birth after  section. She had an uncomplicated postpartum course and has met her postpartum milestones appropriately.  Vitals are stable for discharge.

## 2022-07-20 NOTE — DISCHARGE NOTE OB - CHANGE SANITARY PADS FREQUENTLY.  WASHING AND WIPING SHOULD OCCUR FROM FRONT TO BACK
Physician Documentation                                                                           

 [facility]                                                                 

Name: Van Justice                                                                               

Age: 60 yrs                                                                                       

Sex: Male                                                                                         

: 1960                                                                                   

MRN: [id number]                                                                                   

Arrival Date: 2020                                                                          

Time: 13:55                                                                                       

Account#: [id number]                                                                            

Bed 18                                                                                            

Private MD:                                                                                       

ED Physician Misha Thornton                                                                       

HPI:                                                                                              

                                                                                             

16:10 This 60 yrs old  Male presents to ER via Wheelchair with complaints of Vision  cp  

      Problem, Headache, Confusion.                                                               

16:10 The patient's problem is reported as altered mental status, confused, visual            cp  

      difficulty, decreased vision in the right eye, decreased visual field, headache.            

16:10 Onset: The symptoms/episode began/occurred yesterday, approximately 1500. Duration: The cp  

      episode is continuous. Associated signs and symptoms: Pertinent positives: confusion,       

      headache, Pertinent negatives: abdominal pain, agitation, chest pain, combativeness,        

      numbness, palpitations, tingling, weakness. Patient's baseline: Neuro: alert and fully      

      oriented, Motor: no deficits, Ambulation: walks without assistance, Speech: normal, The     

      patient has a previous history of MI.                                                       

                                                                                                  

Historical:                                                                                       

- Allergies:                                                                                      

14:08 NKA;                                                                                    ss  

- PMHx:                                                                                           

14:08 Diabetes - NIDDM; Hypertension; High Cholesterol; Myocardial infarction;                ss  

- PSHx:                                                                                           

14:08 Heart stents;                                                                           ss  

                                                                                                  

- Immunization history:: Adult Immunizations up to date.                                          

- Social history:: Smoking status: Patient denies any tobacco usage or history of.                

  Patient uses alcohol, only on a social basis.                                                   

                                                                                                  

                                                                                                  

ROS:                                                                                              

16:15 Constitutional: Negative for body aches, chills, fever, poor PO intake.                 cp  

16:15 Eyes: Positive for vision loss, visual disturbance, Negative for discharge, pain,       cp  

      redness.                                                                                    

16:15 ENT: Negative for ear pain, sore throat, difficulty swallowing, difficulty handling         

      secretions.                                                                                 

16:15 Cardiovascular: Negative for chest pain, edema, palpitations.                               

16:15 Respiratory: Negative for cough, shortness of breath, wheezing.                             

16:15 Abdomen/GI: Negative for abdominal pain, nausea, vomiting, and diarrhea.                    

16:15 Back: Negative for pain at rest, pain with movement.                                        

16:15 Neuro: Positive for headache, confusion, Negative for altered mental status, dizziness,     

      gait disturbance, numbness, speech changes, syncope, tingling, weakness.                    

16:15 All other systems are negative.                                                             

                                                                                                  

Exam:                                                                                             

16:20 Constitutional: The patient appears in no acute distress, alert, awake,                 cp  

      non-diaphoretic, non-toxic, well developed, well nourished.                                 

16:20 Head/Face:  Normocephalic, atraumatic.                                                  cp  

16:20 Eyes: Periorbital structures: appear normal, Pupils: equal, round, and reactive to          

      light and accomodation, Extraocular movements: intact throughout, Conjunctiva: normal,      

      no exudate, no injection, Sclera: no appreciated abnormality, Lids and lashes: appear       

      normal, bilaterally, Visual fields: loss noted approximately 9 to 10 o'clock position       

      of right eye only. Examination of the other eye reveals no obvious gross abnormality.       

16:20 ENT: External ear(s): are unremarkable, Nose: is normal, Mouth: is normal, Posterior        

      pharynx: is normal, airway is patent, no erythema, no exudate.                              

16:20 Neck: ROM/movement: is normal, is supple, without pain, no range of motions                 

      limitations, no nuchal rigidity.                                                            

16:20 Chest/axilla: Inspection: normal, Palpation: is normal, no crepitus, no tenderness.         

16:20 Cardiovascular: Rate: normal, Rhythm: regular, Edema: is not appreciated, JVD: is not       

      appreciated.                                                                                

16:20 Respiratory: the patient does not display signs of respiratory distress,  Respirations:     

      normal, no use of accessory muscles, no retractions, labored breathing, is not present,     

      Breath sounds: are clear throughout, no decreased breath sounds, no stridor, no             

      wheezing.                                                                                   

16:20 Abdomen/GI: Inspection: abdomen appears normal, Palpation: abdomen is soft and              

      non-tender, in all quadrants.                                                               

16:20 Back: pain, is absent, ROM is normal.                                                       

16:20 Skin: no rash present.                                                                      

16:20 Neuro: Orientation: to person, place \T\ time. Mentation: slow to respond, confused,        

      Cerebellar function: Romberg testing is negative, normal finger to nose testing, heel       

      to shin testing is normal, Motor: moves all fours, strength is normal, Sensation: is        

      normal.                                                                                     

16:35 ECG was reviewed by the Attending Physician.                                            cp  

16:50 Radiologist reports: acute infarction left medial occipital lobe                        cp  

                                                                                                  

Vital Signs:                                                                                      

14:05  / 72; Pulse 70; Resp 16; Temp 98.3(TE); Pulse Ox 96% ; Weight 104.33 kg; Height  ss  

      5 ft. 4 in. (162.56 cm);                                                                    

17:00  / 74; Pulse 67; Resp 17; Pulse Ox 96% on R/A;                                    vc  

19:00  / 68; Pulse 66; Resp 18; Pulse Ox 96% on R/A;                                    vc  

14:05 Body Mass Index 39.48 (104.33 kg, 162.56 cm)                                              

                                                                                                  

NIH Stroke Scale Scores:                                                                          

16:50 NIHSS Score: 2                                                                          cp  

                                                                                                  

Visual Acuity:                                                                                    

16:46 Left Eye Visual acuity 20/25, ; Right Eye Visual acuity 20/30, ; Both Eyes Visual       vc  

      acuity 20/25; Without Lenses;                                                               

                                                                                                  

MDM:                                                                                              

16:05 Patient medically screened.                                                             cp  

16:20 Differential diagnosis: CVA, TIA, Dementia, metabolic disorder, drug effects.           cp  

16:45 ED course: Patient is not a candidate for tpa as onset of symptoms started more than 24 cp  

      hrs ago.                                                                                    

17:00 Data reviewed: vital signs, nurses notes, lab test result(s), EKG, radiologic studies,  cp  

      CT scan, I have discussed the patient's presentation/case with the attending Emergency      

      Department Physician;.                                                                      

17:00 Test interpretation: by ED physician or midlevel provider: ECG.                         cp  

17:01 Physician consultation: Tim Abad MD was called at 17:01, was contacted at 17:01, cp  

      regarding consult, patient's condition.                                                     

17:04 Physician consultation: Raffy AREVALO was called at 17:05, was contacted at 17:05,     

      regarding admission, to the telemetry unit. patient's condition.                            

                                                                                                  

                                                                                             

16:06 Order name: Basic Metabolic Panel; Complete Time: 17:21                                 cp  

                                                                                             

16:06 Order name: CBC with Diff; Complete Time: 16:58                                         cp  

08                                                                                             

17:22 Interpretation: Reviewed.                                                                 

                                                                                             

16:06 Order name: LFT's; Complete Time: 17:21                                                 cp  

                                                                                             

16:06 Order name: Magnesium; Complete Time: 17:21                                             cp  

                                                                                             

16:06 Order name: NT PRO-BNP; Complete Time: 17:21                                            cp  

                                                                                             

16:06 Order name: PT-INR; Complete Time: 16:58                                                cp  

                                                                                             

16:06 Order name: CT Head Brain wo Cont; Complete Time: 16:41                                 cp  

                                                                                             

16:06 Order name: Troponin (emerg Dept Use Only); Complete Time: 17:21                          

                                                                                             

17:21 Interpretation: TROPED < 0.02; Reviewed.                                                  

                                                                                             

16:06 Order name: XRAY Chest (1 view)                                                           

                                                                                             

17:21 Order name: MRI Stroke Protocol                                                           

                                                                                             

19:09 Order name: MRI                                                                         EDMS

                                                                                             

19:16 Order name: MRI                                                                         EDMS

                                                                                             

19:17 Order name: MRI                                                                         EDMS

                                                                                             

16:06 Order name: EKG; Complete Time: 16:07                                                     

                                                                                             

16:06 Order name: Cardiac monitoring; Complete Time: 16:37                                      

                                                                                             

16:06 Order name: EKG - Nurse/Tech; Complete Time: 16:37                                        

                                                                                             

16:06 Order name: IV Saline Lock; Complete Time: 16:37                                          

                                                                                             

16:06 Order name: Labs collected and sent; Complete Time: 16:37                                 

                                                                                             

16:06 Order name: O2 Per Protocol; Complete Time: 16:37                                         

                                                                                             

16:06 Order name: O2 Sat Monitoring; Complete Time: 16:37                                       

                                                                                             

16:06 Order name: Visual Acuity; Complete Time: 16:48                                         cp  

                                                                                                  

EC:35 Rate is 63 beats/min. Rhythm is regular. TN interval is normal. QRS interval is normal. cp  

      QT interval is normal. T waves are Inverted in lead aVL. Interpreted by me. Reviewed by     

      me.                                                                                         

                                                                                                  

Administered Medications:                                                                         

16:57 Drug: foLIC Acid 1 mg Route: IVPB; Site: right antecubital;                             vc  

17:39 Follow up: IV Status: Completed infusion; IV Intake: 50ml                               vc  

16:57 Drug: Aspirin 81 mg Route: PO;                                                          vc  

17:38 Follow up: Response: No adverse reaction                                                vc  

16:57 Drug: Tylenol 650 mg Route: PO;                                                         vc  

17:38 Follow up: Response: No adverse reaction                                                vc  

17:31 Drug: NS 0.9% 1000 ml Route: IV; Rate: 1 bolus; Site: right antecubital;                vc  

                                                                                                  

                                                                                                  

Disposition:                                                                                      

17:30 Chart complete.                                                                           

                                                                                             

07:08 Co-signature as Attending Physician, Misha Thornton MD I agree with the assessment and   kdr 

      plan of care.                                                                               

                                                                                                  

Disposition:                                                                                      

20 17:05 Hospitalization ordered by Joaquim Fontaine for Inpatient Admission. Preliminary     

  diagnosis is Cerebral infarction.                                                               

- Bed requested for Telemetry/MedSurg (Inpatient).                                                

- Status is Inpatient Admission.                                                              ea  

- Condition is Stable.                                                                            

- Problem is new.                                                                                 

- Symptoms are unchanged.                                                                         

                                                                                                  

                                                                                                  

                                                                                                  

                                                                                                  

NIH Stroke Scale - NIH Stroke Score                                                               

Date: 2020                                                                                  

Time: 16:50                                                                                       

Total Score = 2                                                                                   

  1a. Level of Consciousness (LOC) - 0(Alert)                                                     

  1b. Level of Consciousness (LOC) (Year \T\ Age) - 0(Both)                                       

  1c. LOC Commands (Open \T\ Closes Eyes/) - 0(Both)                                          

   2. Best Gaze (Lateral Gaze Paresis) - 0(Normal)                                                

   3. Visual Field Loss - 1(Partial hemianopia)                                                   

   4. Facial Palsy - 0(Normal)                                                                    

  5a. Left Arm: Motor (10-second hold) - 0(No drift)                                              

  5b. Right Arm: Motor (10-second hold) - 0(No drift)                                             

  6a. Left Leg: Motor (5-second hold - always test supine) - 0(No drift)                          

  6b. Right Leg: Motor (5-second hold - always test supine) - 0(No drift)                         

   7. Limb Ataxia (finger/nose \T\ heel/shin - test with eyes open) - 0(Absent)                   

   8. Sensory Loss (pinprick arms/legs/face) - 0(Normal)                                          

   9. Best Language: Aphasia (description/naming/reading) - 1(Mild to moderate                    

      aphasia)                                                                                    

  10. Dysarthria (speech clarity - read or repeat words) - 0(Normal)                              

  11. Extinction and Inattention (visual/tactile/auditory/spatial/personal) - 0(No                

      abnormality)                                                                                

Initials: cp                                                                                      

                                                                                                  

Signatures:                                                                                       

Dispatcher [employer]                           Riri Arreguin RN RN kl Rittger, Kevin, MD MD kdr Smirch, Shelby, RN RN ss Page, Corey, PA                         PA   cp                                                   

Guera Gabriel RN RN ea Calcote, Vanessa, RN RN   vc                                                   

                                                                                                  

Corrections: (The following items were deleted from the chart)                                    

                                                                                             

17:57 17:05 Hospitalization Ordered by Joaquim Fontaine DO for Inpatient Admission.      vi          

      Preliminary diagnosis is Cerebral infarction. Bed requested for                             

      Telemetry/MedSurg (Inpatient). Status is Inpatient Admission. Condition is                  

      Stable. Problem is new. Symptoms are unchanged. cp                                          

20:17 17:57 2020 17:05 Hospitalization Ordered by Joaquim Fontaine DO for          ea          

      Inpatient Admission. Preliminary diagnosis is Cerebral infarction. Bed                      

      requested for Telemetry/MedSurg (Inpatient). Status is Inpatient Admission.                 

      Condition is Stable. Problem is new. Symptoms are unchanged. kl                             

                                                                                                  

**************************************************************************************************
Statement Selected

## 2022-07-20 NOTE — DISCHARGE NOTE OB - PATIENT PORTAL LINK FT
You can access the FollowMyHealth Patient Portal offered by [facility] by registering at the following website: [email or web address]. By joining [employer]’s FollowMyHealth portal, you will also be able to view your health information using other applications (apps) compatible with our system.

## 2022-07-20 NOTE — DISCHARGE NOTE OB - CARE PLAN
1 Principal Discharge DX:	Vaginal birth after  delivery   Principal Discharge DX:	Vaginal birth after  delivery  Assessment and plan of treatment:	Vaginal delivery, meeting all postpartum milestones.  Please follow-up with your OB doctor within 6 weeks.  You can resume a regular diet at home and may continue your prenatal vitamins as directed.  Please place nothing in the vagina for 6 weeks (no tampons, sex, douching, tub baths, swimming pools, etc).  If you have severe headaches and/or vision changes, heavy bleeding, or chest pain, please call your provider or go to the nearest Emergency Department.  Please call your OB with any signs of symptoms of infection including fever > 100.4 degrees, severe pain, malodorous vaginal discharge or heavy bleeding requiring more than 1-2 pads/hour.  You can take Motrin 600mg orally every 6 hours for pain as needed.

## 2022-07-20 NOTE — PROGRESS NOTE ADULT - ASSESSMENT
A/P 37y s/p , PPD#1 , stable, meeting postpartum milestones   - Pain: well controlled on tylenol/motrin  - GI: Tolerating regular diet  - : urinating without difficulty/pain  - DVT prophylaxis: ambulating frequently  - Dispo: PPD 2, unless otherwise specified

## 2022-07-20 NOTE — DISCHARGE NOTE OB - NS MD DC FALL RISK RISK
For information on Fall & Injury Prevention, visit: [email or web address] OR  [email or web address] OR  https://www.cdc.gov/steadi/patient.html

## 2022-07-20 NOTE — PROGRESS NOTE ADULT - SUBJECTIVE AND OBJECTIVE BOX
Patient evaluated at bedside this morning, resting comfortable in bed, no acute events overnight.  She reports pain is well controlled with tylenol and motrin.  She denies headache, dizziness, chest pain, palpitations, shortness of breath, nausea, vomiting, heavy vaginal bleeding or perineal discomfort. Reports decrease in amount of vaginal bleeding and denies clots.  She has been ambulating without assistance, voiding spontaneously.  Tolerating food well, without nausea/vomit.      Physical Exam:  T(C): 36.8 (07-20-22 @ 06:15), Max: 36.8 (07-19-22 @ 22:00)  HR: 86 (07-20-22 @ 06:15) (78 - 102)  BP: 113/76 (07-20-22 @ 06:15) (113/76 - 126/67)  RR: 17 (07-20-22 @ 06:15) (16 - 18)  SpO2: 97% (07-20-22 @ 06:15) (97% - 100%)    GA: NAD, comfortable, conversational  Abd: soft, nontender, nondistended, no rebound or guarding, uterus firm and below the level of the umbilicus, prior c/s scar in tact and nontender  Extremities: no swelling or calf tenderness  Perineum: lochia wnl as expected                          13.6   12.11 )-----------( 165      ( 19 Jul 2022 17:09 )             39.3           acetaminophen     Tablet .. 975 milliGRAM(s) Oral <User Schedule>  benzocaine 20%/menthol 0.5% Spray 1 Spray(s) Topical every 6 hours PRN  dibucaine 1% Ointment 1 Application(s) Topical every 6 hours PRN  diphenhydrAMINE 25 milliGRAM(s) Oral every 6 hours PRN  diphtheria/tetanus/pertussis (acellular) Vaccine (ADAcel) 0.5 milliLiter(s) IntraMuscular once  hydrocortisone 1% Cream 1 Application(s) Topical every 6 hours PRN  ibuprofen  Tablet. 600 milliGRAM(s) Oral every 6 hours  lanolin Ointment 1 Application(s) Topical every 6 hours PRN  magnesium hydroxide Suspension 30 milliLiter(s) Oral two times a day PRN  oxyCODONE    IR 5 milliGRAM(s) Oral every 3 hours PRN  oxyCODONE    IR 5 milliGRAM(s) Oral once PRN  oxytocin Infusion 333.333 milliUNIT(s)/Min IV Continuous <Continuous>  pramoxine 1%/zinc 5% Cream 1 Application(s) Topical every 4 hours PRN  prenatal multivitamin 1 Tablet(s) Oral daily  simethicone 80 milliGRAM(s) Chew every 4 hours PRN  sodium chloride 0.9% lock flush 3 milliLiter(s) IV Push every 8 hours  witch hazel Pads 1 Application(s) Topical every 4 hours PRN

## 2022-07-20 NOTE — DISCHARGE NOTE OB - CARE PROVIDER_API CALL
Nick Siddiqui  OBSTETRICS AND GYNECOLOGY  [address] [place] [address]  Phone: [phone]  Fax: [phone]  Follow Up Time:

## 2022-07-20 NOTE — LACTATION INITIAL EVALUATION - NS LACT CON REASON FOR REQ
Rounded on Multipara  with female infant.  Mother declined assistance with breastfeeding.  She reported that this is her second baby and she  for about 18 months her first child and she is confident with positioning and denied any pain during breastfeeding sessions.  Instructed Mother on LC availability should she need a follow up visit.  Mother verbalized understanding./general questions without assessment/multiparous mom

## 2023-12-29 NOTE — PATIENT PROFILE OB - NS_PRENATALLABSOURCEGBSBACTDT_OBGYN_ALL_OB
From: Maria Victoria Walker  To: Octavia Lacey  Sent: 12/29/2023 8:27 AM CST  Subject: Period concerns post suction d&c    Hi, I wanted to touch base since I was having trouble with cramping and PMS like symptoms without having a period after my suction d&c on October 5. I ended up having a very brief & light period that lasted 3 days which started on November 24th. I am now on day 36 without a period and the same thing is occurring where I have cramps and PMS symptoms for a week now without spotting or a period. Prior to the suction d&c I ovulated regularly and had consistent 27-29 day periods with 5-6 days of bleeding. I also have been testing for ovulation and I have not had a spike in LH so I am concerned now that I have had the procedure I am not ovulating. Is there any follow up care recommended? Is there something I can take to help ovulation start again like clomid? Thank you.    07-Jul-2022